# Patient Record
Sex: FEMALE | Race: WHITE | Employment: FULL TIME | URBAN - METROPOLITAN AREA
[De-identification: names, ages, dates, MRNs, and addresses within clinical notes are randomized per-mention and may not be internally consistent; named-entity substitution may affect disease eponyms.]

---

## 2019-11-26 ENCOUNTER — TELEPHONE (OUTPATIENT)
Dept: FAMILY MEDICINE | Facility: CLINIC | Age: 59
End: 2019-11-26

## 2019-11-26 ENCOUNTER — OFFICE VISIT (OUTPATIENT)
Dept: FAMILY MEDICINE | Facility: CLINIC | Age: 59
End: 2019-11-26
Payer: COMMERCIAL

## 2019-11-26 VITALS
HEART RATE: 103 BPM | WEIGHT: 139 LBS | OXYGEN SATURATION: 98 % | SYSTOLIC BLOOD PRESSURE: 135 MMHG | BODY MASS INDEX: 24.63 KG/M2 | TEMPERATURE: 98.3 F | HEIGHT: 63 IN | DIASTOLIC BLOOD PRESSURE: 79 MMHG

## 2019-11-26 DIAGNOSIS — M62.830 SPASM OF BACK MUSCLES: ICD-10-CM

## 2019-11-26 DIAGNOSIS — M25.50 MULTIPLE JOINT PAIN: Primary | ICD-10-CM
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
## 2019-11-26 PROCEDURE — 99213 OFFICE O/P EST LOW 20 MIN: CPT | Performed by: NURSE PRACTITIONER

## 2019-11-26 RX ORDER — CYCLOBENZAPRINE HCL 10 MG
10 TABLET ORAL 3 TIMES DAILY PRN
Qty: 20 TABLET | Refills: 1 | Status: SHIPPED | OUTPATIENT
Start: 2019-11-26

## 2019-11-26 RX ORDER — PIROXICAM 10 MG/1
10 CAPSULE ORAL DAILY
Qty: 30 CAPSULE | Refills: 0 | Status: SHIPPED | OUTPATIENT
Start: 2019-11-26

## 2019-11-26 ASSESSMENT — MIFFLIN-ST. JEOR: SCORE: 1174.63
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
## 2019-11-27 NOTE — TELEPHONE ENCOUNTER
Marlyn,    Please see message below. Pt requesting 20 mg caps of Feldene, instead was given 10 mg at OV today.  Please review and authorize if appropriate.    Thank you,   Javad MASSEY RN

## 2019-11-27 NOTE — TELEPHONE ENCOUNTER
Reason for call:  Pt was rx'd Feldene 10mg caps at today's . She is requesting Feldene 20mg caps. She was given 5 caps at [address] because they didn't have enough to fill the rx.    Phone number to reach patient:  Cell number on file:    Telephone Information:   Mobile [phone]       Best Time:      Can we leave a detailed message on this number?  YES

## 2019-12-05 ENCOUNTER — THERAPY VISIT (OUTPATIENT)
Dept: PHYSICAL THERAPY | Facility: CLINIC | Age: 59
End: 2019-12-05
Payer: COMMERCIAL

## 2019-12-05 DIAGNOSIS — M54.2 NECK PAIN: ICD-10-CM

## 2019-12-05 DIAGNOSIS — M62.830 SPASM OF BACK MUSCLES: ICD-10-CM

## 2019-12-05 DIAGNOSIS — M54.6 ACUTE BILATERAL THORACIC BACK PAIN: ICD-10-CM

## 2019-12-05 DIAGNOSIS — M54.50 ACUTE BILATERAL LOW BACK PAIN WITHOUT SCIATICA: ICD-10-CM

## 2019-12-05 PROCEDURE — 97112 NEUROMUSCULAR REEDUCATION: CPT | Mod: GP | Performed by: PHYSICAL THERAPIST

## 2019-12-05 PROCEDURE — 97161 PT EVAL LOW COMPLEX 20 MIN: CPT | Mod: GP | Performed by: PHYSICAL THERAPIST
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
## 2019-12-11 PROBLEM — M54.50 BILATERAL LOW BACK PAIN WITHOUT SCIATICA: Status: ACTIVE | Noted: 2019-12-11

## 2019-12-11 PROBLEM — M54.6 BILATERAL THORACIC BACK PAIN: Status: ACTIVE | Noted: 2019-12-11

## 2019-12-11 PROBLEM — M54.2 NECK PAIN: Status: ACTIVE | Noted: 2019-12-11

## 2020-01-07 ENCOUNTER — THERAPY VISIT (OUTPATIENT)
Dept: PHYSICAL THERAPY | Facility: CLINIC | Age: 60
End: 2020-01-07
Payer: COMMERCIAL

## 2020-01-07 DIAGNOSIS — M54.50 ACUTE BILATERAL LOW BACK PAIN WITHOUT SCIATICA: ICD-10-CM

## 2020-01-07 DIAGNOSIS — M54.2 NECK PAIN: ICD-10-CM

## 2020-01-07 DIAGNOSIS — M54.6 ACUTE BILATERAL THORACIC BACK PAIN: ICD-10-CM

## 2020-01-07 PROCEDURE — 97140 MANUAL THERAPY 1/> REGIONS: CPT | Mod: GP | Performed by: PHYSICAL THERAPIST

## 2020-01-07 PROCEDURE — 97112 NEUROMUSCULAR REEDUCATION: CPT | Mod: GP | Performed by: PHYSICAL THERAPIST

## 2020-01-13 ENCOUNTER — THERAPY VISIT (OUTPATIENT)
Dept: PHYSICAL THERAPY | Facility: CLINIC | Age: 60
End: 2020-01-13
Payer: COMMERCIAL

## 2020-01-13 DIAGNOSIS — M54.2 NECK PAIN: ICD-10-CM

## 2020-01-13 DIAGNOSIS — M54.50 ACUTE BILATERAL LOW BACK PAIN WITHOUT SCIATICA: ICD-10-CM

## 2020-01-13 DIAGNOSIS — M54.6 ACUTE BILATERAL THORACIC BACK PAIN: ICD-10-CM

## 2020-01-13 PROCEDURE — 97112 NEUROMUSCULAR REEDUCATION: CPT | Mod: GP | Performed by: PHYSICAL THERAPIST

## 2020-01-13 PROCEDURE — 97110 THERAPEUTIC EXERCISES: CPT | Mod: GP | Performed by: PHYSICAL THERAPIST

## 2020-01-13 PROCEDURE — 97140 MANUAL THERAPY 1/> REGIONS: CPT | Mod: GP | Performed by: PHYSICAL THERAPIST

## 2020-03-24 PROBLEM — M54.2 NECK PAIN: Status: RESOLVED | Noted: 2019-12-11 | Resolved: 2020-03-24

## 2020-03-24 PROBLEM — M54.50 BILATERAL LOW BACK PAIN WITHOUT SCIATICA: Status: RESOLVED | Noted: 2019-12-11 | Resolved: 2020-03-24

## 2020-03-24 PROBLEM — M54.6 BILATERAL THORACIC BACK PAIN: Status: RESOLVED | Noted: 2019-12-11 | Resolved: 2020-03-24

## 2020-03-24 NOTE — PROGRESS NOTES
Keyanna Tineo was seen 3 times for evaluation and treatment.  Patient did not return for further treatment and current status is unknown.  Due to short treatment duration, no objective or functional changes were made.  Please see goal flow sheet from episode noted date below and initial evaluation for further information.    Linked Episodes   Type: Episode: Status: Noted: Resolved: Last update: Updated by:   PHYSICAL THERAPY LBP, neck/shld 12/5/19 Active 12/5/2019 1/13/2020 11:33 AM Elif Olmstead, PT      Comments:

## 2020-04-08 ENCOUNTER — TELEPHONE (OUTPATIENT)
Dept: PHYSICAL THERAPY | Facility: CLINIC | Age: 60
End: 2020-04-08

## 2020-04-08 NOTE — TELEPHONE ENCOUNTER
Spoke with patient to offer virtual PT visit due to corona virus in clinic restrictions- patient declines.  She would like to return to PT in clinic as able after May 4th.  She will call to schedule.

## 2025-03-26 ENCOUNTER — APPOINTMENT (OUTPATIENT)
Dept: MRI IMAGING | Facility: CLINIC | Age: 65
End: 2025-03-26
Attending: EMERGENCY MEDICINE
Payer: COMMERCIAL

## 2025-03-26 ENCOUNTER — HOSPITAL ENCOUNTER (EMERGENCY)
Facility: CLINIC | Age: 65
Discharge: HOME | End: 2025-03-26
Attending: EMERGENCY MEDICINE
Payer: COMMERCIAL

## 2025-03-26 VITALS
OXYGEN SATURATION: 99 % | DIASTOLIC BLOOD PRESSURE: 77 MMHG | TEMPERATURE: 97 F | SYSTOLIC BLOOD PRESSURE: 115 MMHG | HEART RATE: 85 BPM | RESPIRATION RATE: 18 BRPM

## 2025-03-26 DIAGNOSIS — R42 VERTIGO: ICD-10-CM

## 2025-03-26 DIAGNOSIS — M62.830 SPASM OF BACK MUSCLES: ICD-10-CM

## 2025-03-26 LAB
ANION GAP SERPL CALCULATED.3IONS-SCNC: 11 MMOL/L (ref 7–15)
ATRIAL RATE - MUSE: 102 BPM
BASOPHILS # BLD AUTO: 0 10E3/UL (ref 0–0.2)
BASOPHILS NFR BLD AUTO: 1 %
BUN SERPL-MCNC: 14.5 MG/DL (ref 8–23)
CALCIUM SERPL-MCNC: 10 MG/DL (ref 8.8–10.4)
CHLORIDE SERPL-SCNC: 98 MMOL/L (ref 98–107)
CREAT SERPL-MCNC: 0.81 MG/DL (ref 0.51–0.95)
DIASTOLIC BLOOD PRESSURE - MUSE: NORMAL MMHG
EGFRCR SERPLBLD CKD-EPI 2021: 81 ML/MIN/1.73M2
EOSINOPHIL # BLD AUTO: 0.1 10E3/UL (ref 0–0.7)
EOSINOPHIL NFR BLD AUTO: 1 %
ERYTHROCYTE [DISTWIDTH] IN BLOOD BY AUTOMATED COUNT: 12.9 % (ref 10–15)
GLUCOSE SERPL-MCNC: 109 MG/DL (ref 70–99)
HCO3 SERPL-SCNC: 27 MMOL/L (ref 22–29)
HCT VFR BLD AUTO: 44.3 % (ref 35–47)
HGB BLD-MCNC: 14.4 G/DL (ref 11.7–15.7)
IMM GRANULOCYTES # BLD: 0 10E3/UL
IMM GRANULOCYTES NFR BLD: 0 %
INTERPRETATION ECG - MUSE: NORMAL
LYMPHOCYTES # BLD AUTO: 1.2 10E3/UL (ref 0.8–5.3)
LYMPHOCYTES NFR BLD AUTO: 19 %
MCH RBC QN AUTO: 31.3 PG (ref 26.5–33)
MCHC RBC AUTO-ENTMCNC: 32.5 G/DL (ref 31.5–36.5)
MCV RBC AUTO: 96 FL (ref 78–100)
MONOCYTES # BLD AUTO: 0.6 10E3/UL (ref 0–1.3)
MONOCYTES NFR BLD AUTO: 10 %
NEUTROPHILS # BLD AUTO: 4.2 10E3/UL (ref 1.6–8.3)
NEUTROPHILS NFR BLD AUTO: 69 %
NRBC # BLD AUTO: 0 10E3/UL
NRBC BLD AUTO-RTO: 0 /100
P AXIS - MUSE: 81 DEGREES
PLATELET # BLD AUTO: 287 10E3/UL (ref 150–450)
POTASSIUM SERPL-SCNC: 4.5 MMOL/L (ref 3.4–5.3)
PR INTERVAL - MUSE: 118 MS
QRS DURATION - MUSE: 70 MS
QT - MUSE: 326 MS
QTC - MUSE: 424 MS
R AXIS - MUSE: 88 DEGREES
RBC # BLD AUTO: 4.6 10E6/UL (ref 3.8–5.2)
SODIUM SERPL-SCNC: 136 MMOL/L (ref 135–145)
SYSTOLIC BLOOD PRESSURE - MUSE: NORMAL MMHG
T AXIS - MUSE: 67 DEGREES
TROPONIN T SERPL HS-MCNC: 6 NG/L
VENTRICULAR RATE- MUSE: 102 BPM
WBC # BLD AUTO: 6.1 10E3/UL (ref 4–11)

## 2025-03-26 PROCEDURE — 36415 COLL VENOUS BLD VENIPUNCTURE: CPT | Performed by: EMERGENCY MEDICINE

## 2025-03-26 PROCEDURE — 84484 ASSAY OF TROPONIN QUANT: CPT | Performed by: EMERGENCY MEDICINE

## 2025-03-26 PROCEDURE — 85004 AUTOMATED DIFF WBC COUNT: CPT | Performed by: EMERGENCY MEDICINE

## 2025-03-26 PROCEDURE — A9585 GADOBUTROL INJECTION: HCPCS | Performed by: EMERGENCY MEDICINE

## 2025-03-26 PROCEDURE — 85014 HEMATOCRIT: CPT | Performed by: EMERGENCY MEDICINE

## 2025-03-26 PROCEDURE — 82310 ASSAY OF CALCIUM: CPT | Performed by: EMERGENCY MEDICINE

## 2025-03-26 PROCEDURE — 80048 BASIC METABOLIC PNL TOTAL CA: CPT | Performed by: EMERGENCY MEDICINE

## 2025-03-26 PROCEDURE — 255N000002 HC RX 255 OP 636: Performed by: EMERGENCY MEDICINE

## 2025-03-26 PROCEDURE — 93005 ELECTROCARDIOGRAM TRACING: CPT

## 2025-03-26 PROCEDURE — 70553 MRI BRAIN STEM W/O & W/DYE: CPT

## 2025-03-26 PROCEDURE — 99285 EMERGENCY DEPT VISIT HI MDM: CPT | Mod: 25

## 2025-03-26 RX ORDER — MECLIZINE HYDROCHLORIDE 25 MG/1
25 TABLET ORAL 3 TIMES DAILY PRN
Qty: 20 TABLET | Refills: 0 | Status: SHIPPED | OUTPATIENT
Start: 2025-03-26

## 2025-03-26 RX ORDER — CYCLOBENZAPRINE HCL 10 MG
10 TABLET ORAL 3 TIMES DAILY PRN
Qty: 20 TABLET | Refills: 1 | Status: SHIPPED | OUTPATIENT
Start: 2025-03-26

## 2025-03-26 RX ORDER — GADOBUTROL 604.72 MG/ML
6 INJECTION INTRAVENOUS ONCE
Status: COMPLETED | OUTPATIENT
Start: 2025-03-26 | End: 2025-03-26

## 2025-03-26 RX ADMIN — GADOBUTROL 6 ML: 604.72 INJECTION INTRAVENOUS at 19:46

## 2025-03-26 ASSESSMENT — ACTIVITIES OF DAILY LIVING (ADL)
ADLS_ACUITY_SCORE: 41

## 2025-03-26 NOTE — ED PROVIDER NOTES
Emergency Department Note      History of Present Illness     Chief Complaint   Dizziness      HPI   Keyanna Tineo is a 64 year old female with a history of anxiety and hypoglycemia presenting with dizziness. The patient reports waking up yesterday around 0700 and as she was standing up from bed, she began to feel unsteady, falling back on her bed. Patient then, got up to walk to the bathroom and felt herself swaying from side to side, stating her equilibrium seem to be off. Whilst in the bathroom, she felt as if she were about to have a syncopal episode and sat on the floor until symptoms passed. Keyanna continued her daily activities, calling the nurse line for advice and was told she should schedule an appointment too get checked on. Today, she had another episode of dizziness whilst brushing her teeth and gargling, stating that her symptoms seem to be position. She went to  and was instructed to come into the ED for further evaluation. Otherwise, keyanna notes feeling neck stiffness, occasional sweaty palms and bilateral arm aches. Patient denies vision changes, headaches, additional pain, syncope or other symptoms.     Independent Historian   None    Review of External Notes       Past Medical History     Medical History and Problem List   Anxiety  Carcinoma in situ of cervix uteri  Depressed  Hypoglycemia, unspecified  PONV (postoperative nausea and vomiting)    Medications   The patient is not currently taking any regular medications.      Surgical History   D & C  Hysterectomy     Physical Exam     Patient Vitals for the past 24 hrs:   BP Temp Pulse Resp SpO2   03/26/25 1543 133/87 97  F (36.1  C) 118 16 99 %     Physical Exam  Vitals reviewed.   HENT:      Head: Normocephalic.      Right Ear: Tympanic membrane normal.      Left Ear: Tympanic membrane normal.      Nose: Nose normal.      Mouth/Throat:      Mouth: Mucous membranes are moist.   Eyes:      Extraocular Movements: Extraocular movements intact.       Pupils: Pupils are equal, round, and reactive to light.   Cardiovascular:      Rate and Rhythm: Normal rate and regular rhythm.   Pulmonary:      Effort: Pulmonary effort is normal.   Abdominal:      General: Abdomen is flat. Bowel sounds are normal.   Musculoskeletal:         General: Normal range of motion.   Skin:     General: Skin is warm.      Capillary Refill: Capillary refill takes less than 2 seconds.   Neurological:      General: No focal deficit present.      Mental Status: She is alert and oriented to person, place, and time.   Psychiatric:      Comments: Mildly anxious           Diagnostics     Lab Results   Labs Ordered and Resulted from Time of ED Arrival to Time of ED Departure   BASIC METABOLIC PANEL - Abnormal       Result Value    Sodium 136      Potassium 4.5      Chloride 98      Carbon Dioxide (CO2) 27      Anion Gap 11      Urea Nitrogen 14.5      Creatinine 0.81      GFR Estimate 81      Calcium 10.0      Glucose 109 (*)    TROPONIN T, HIGH SENSITIVITY - Normal    Troponin T, High Sensitivity 6     CBC WITH PLATELETS AND DIFFERENTIAL    WBC Count 6.1      RBC Count 4.60      Hemoglobin 14.4      Hematocrit 44.3      MCV 96      MCH 31.3      MCHC 32.5      RDW 12.9      Platelet Count 287      % Neutrophils 69      % Lymphocytes 19      % Monocytes 10      % Eosinophils 1      % Basophils 1      % Immature Granulocytes 0      NRBCs per 100 WBC 0      Absolute Neutrophils 4.2      Absolute Lymphocytes 1.2      Absolute Monocytes 0.6      Absolute Eosinophils 0.1      Absolute Basophils 0.0      Absolute Immature Granulocytes 0.0      Absolute NRBCs 0.0         Imaging   MR Brain w/o & w Contrast   Final Result   IMPRESSION:   1.  Unremarkable head MRI.              EKG   ECG taken at 1622, ECG read at 1629  Sinus tachycardia   Right atrial enlargement   Borderline ECG    Rate 102 bpm. PA interval 118 ms. QRS duration 70 ms. QT/QTc 326/424 ms. P-R-T axes 81 88 67.    Independent Interpretation    None    ED Course      Medications Administered   Medications   gadobutrol (GADAVIST) injection 6 mL (6 mLs Intravenous $Given 3/26/25 1946)   sodium chloride (PF) 0.9% PF flush 10 mL (10 mLs Intravenous $Given 3/26/25 1946)       Procedures   Procedures     Discussion of Management   None    ED Course   ED Course as of 03/26/25 2124   Wed Mar 26, 2025   1606 I obtained the history and examined the patient as noted above.     2123 I rechecked the patient and explained findings.        Additional Documentation  None    Medical Decision Making / Diagnosis     CMS Diagnoses: None    MIPS       None    MDM   Keyanna Tineo is a 64 year old female who presents with dizziness described as balance off worse when she goes to stand up.  Patient is well-appearing with a GCS of 15 neurological exam is normal.  No signs of dysdiadochokinesia or pathological eye motion.  However due to age and first-time vertigo MRI ordered for assessment of vertiginous symptoms.  Likely peripheral vertigo but due to patient's lack of clear nystagmus and acute onset recommended MRI for definitive assessment and is negative.  Care was discussed with patient's suspect inner ear vertigo due to his symptoms worse with head position no concerns for dehydration orthostatic hypotension due to normal vital signs and normal lab work.  Patient offered reassurance and discharged home.  Patient then mentioned that she has been under a lot of stress.  Her neck has been stiff for wondering if she be a should be back on Feldene and a muscle relaxant.  Patient was offered the muscle relaxant as a bridge to follow-up with primary care.    Disposition   The patient was discharged.     Diagnosis     ICD-10-CM    1. Vertigo  R42       2. Spasm of back muscles  M62.830 cyclobenzaprine (FLEXERIL) 10 MG tablet           Discharge Medications   Discharge Medication List as of 3/26/2025  9:38 PM        START taking these medications    Details   meclizine (ANTIVERT) 25 MG  tablet Take 1 tablet (25 mg) by mouth 3 times daily as needed for dizziness., Disp-20 tablet, R-0, Local Print           Scribe Disclosure:  I, Bianka Collins, am serving as a scribe at 4:10 PM on 3/26/2025 to document services personally performed by Romel Smith MD based on my observations and the provider's statements to me.        Romel Smith MD  03/27/25 [id number]

## 2025-03-26 NOTE — ED TRIAGE NOTES
"Pt reports that she woke up about 0730 and suddenly started to feel dizzy feeling like her \"equilibrium was off\". Pt then crawled to the bathroom and stood in front of the mirror and made sure she had uneven smile and no facial droop which she did not     Pt reports yesterday both her arms ached    Pt denies and weakness or tingling on one side of the body     Negative neuro assessment in triage    States she still feels slightly dizzy     pt called a friend who is an RN and advised presentation to the ER     "

## 2025-03-27 NOTE — DISCHARGE INSTRUCTIONS
We have done an evaluation for dizziness that is worse with head position.  We have found no central cause for this.  Use meclizine meclizine when dizzy.  Please follow-up with primary care for reassessment due to lack of primary care.  Okay to use muscle relaxant when needed due to neck stiffness.

## 2025-05-03 ENCOUNTER — HEALTH MAINTENANCE LETTER (OUTPATIENT)
Age: 65
End: 2025-05-03